# Patient Record
Sex: MALE | Race: WHITE | URBAN - METROPOLITAN AREA
[De-identification: names, ages, dates, MRNs, and addresses within clinical notes are randomized per-mention and may not be internally consistent; named-entity substitution may affect disease eponyms.]

---

## 2017-02-20 ENCOUNTER — APPOINTMENT (OUTPATIENT)
Dept: GENERAL RADIOLOGY | Facility: CLINIC | Age: 42
DRG: 390 | End: 2017-02-20
Attending: FAMILY MEDICINE
Payer: COMMERCIAL

## 2017-02-20 ENCOUNTER — HOSPITAL ENCOUNTER (INPATIENT)
Facility: CLINIC | Age: 42
LOS: 1 days | Discharge: HOME | DRG: 390 | End: 2017-02-21
Attending: FAMILY MEDICINE | Admitting: INTERNAL MEDICINE
Payer: COMMERCIAL

## 2017-02-20 ENCOUNTER — APPOINTMENT (OUTPATIENT)
Dept: CT IMAGING | Facility: CLINIC | Age: 42
DRG: 390 | End: 2017-02-20
Attending: FAMILY MEDICINE
Payer: COMMERCIAL

## 2017-02-20 DIAGNOSIS — K56.609 SMALL BOWEL OBSTRUCTION (H): ICD-10-CM

## 2017-02-20 PROBLEM — R03.0 ELEVATED BLOOD PRESSURE READING WITHOUT DIAGNOSIS OF HYPERTENSION: Status: ACTIVE | Noted: 2017-02-20

## 2017-02-20 PROBLEM — Z72.0 TOBACCO ABUSE: Status: ACTIVE | Noted: 2017-02-20

## 2017-02-20 LAB
ALBUMIN SERPL-MCNC: 4 G/DL (ref 3.4–5)
ALP SERPL-CCNC: 108 U/L (ref 40–150)
ALT SERPL W P-5'-P-CCNC: 38 U/L (ref 0–70)
ANION GAP SERPL CALCULATED.3IONS-SCNC: 10 MMOL/L (ref 3–14)
AST SERPL W P-5'-P-CCNC: 18 U/L (ref 0–45)
BASOPHILS # BLD AUTO: 0 10E9/L (ref 0–0.2)
BASOPHILS NFR BLD AUTO: 0.3 %
BILIRUB SERPL-MCNC: 0.4 MG/DL (ref 0.2–1.3)
BUN SERPL-MCNC: 12 MG/DL (ref 7–30)
CALCIUM SERPL-MCNC: 9.2 MG/DL (ref 8.5–10.1)
CHLORIDE SERPL-SCNC: 105 MMOL/L (ref 94–109)
CO2 SERPL-SCNC: 29 MMOL/L (ref 20–32)
CREAT SERPL-MCNC: 0.9 MG/DL (ref 0.66–1.25)
DIFFERENTIAL METHOD BLD: NORMAL
EOSINOPHIL # BLD AUTO: 0.1 10E9/L (ref 0–0.7)
EOSINOPHIL NFR BLD AUTO: 1 %
ERYTHROCYTE [DISTWIDTH] IN BLOOD BY AUTOMATED COUNT: 13.8 % (ref 10–15)
GFR SERPL CREATININE-BSD FRML MDRD: NORMAL ML/MIN/1.7M2
GLUCOSE SERPL-MCNC: 93 MG/DL (ref 70–99)
HCT VFR BLD AUTO: 46.3 % (ref 40–53)
HGB BLD-MCNC: 15.8 G/DL (ref 13.3–17.7)
IMM GRANULOCYTES # BLD: 0 10E9/L (ref 0–0.4)
IMM GRANULOCYTES NFR BLD: 0.2 %
LIPASE SERPL-CCNC: 101 U/L (ref 73–393)
LYMPHOCYTES # BLD AUTO: 1.3 10E9/L (ref 0.8–5.3)
LYMPHOCYTES NFR BLD AUTO: 22.4 %
MCH RBC QN AUTO: 28.2 PG (ref 26.5–33)
MCHC RBC AUTO-ENTMCNC: 34.1 G/DL (ref 31.5–36.5)
MCV RBC AUTO: 83 FL (ref 78–100)
MONOCYTES # BLD AUTO: 0.9 10E9/L (ref 0–1.3)
MONOCYTES NFR BLD AUTO: 14.7 %
NEUTROPHILS # BLD AUTO: 3.6 10E9/L (ref 1.6–8.3)
NEUTROPHILS NFR BLD AUTO: 61.4 %
PLATELET # BLD AUTO: 236 10E9/L (ref 150–450)
POTASSIUM SERPL-SCNC: 4.1 MMOL/L (ref 3.4–5.3)
PROT SERPL-MCNC: 8.2 G/DL (ref 6.8–8.8)
RBC # BLD AUTO: 5.6 10E12/L (ref 4.4–5.9)
SODIUM SERPL-SCNC: 144 MMOL/L (ref 133–144)
WBC # BLD AUTO: 5.9 10E9/L (ref 4–11)

## 2017-02-20 PROCEDURE — 96374 THER/PROPH/DIAG INJ IV PUSH: CPT

## 2017-02-20 PROCEDURE — 25000125 ZZHC RX 250: Performed by: RADIOLOGY

## 2017-02-20 PROCEDURE — 25000128 H RX IP 250 OP 636: Performed by: FAMILY MEDICINE

## 2017-02-20 PROCEDURE — 83690 ASSAY OF LIPASE: CPT | Performed by: FAMILY MEDICINE

## 2017-02-20 PROCEDURE — 96376 TX/PRO/DX INJ SAME DRUG ADON: CPT

## 2017-02-20 PROCEDURE — 99285 EMERGENCY DEPT VISIT HI MDM: CPT | Performed by: FAMILY MEDICINE

## 2017-02-20 PROCEDURE — 99222 1ST HOSP IP/OBS MODERATE 55: CPT | Mod: AI | Performed by: INTERNAL MEDICINE

## 2017-02-20 PROCEDURE — 85025 COMPLETE CBC W/AUTO DIFF WBC: CPT | Performed by: FAMILY MEDICINE

## 2017-02-20 PROCEDURE — 99207 ZZC MOONLIGHTING INDICATOR: CPT | Performed by: INTERNAL MEDICINE

## 2017-02-20 PROCEDURE — 25000128 H RX IP 250 OP 636: Performed by: INTERNAL MEDICINE

## 2017-02-20 PROCEDURE — 74177 CT ABD & PELVIS W/CONTRAST: CPT

## 2017-02-20 PROCEDURE — 96361 HYDRATE IV INFUSION ADD-ON: CPT

## 2017-02-20 PROCEDURE — 25500064 ZZH RX 255 OP 636: Performed by: RADIOLOGY

## 2017-02-20 PROCEDURE — 12000000 ZZH R&B MED SURG/OB

## 2017-02-20 PROCEDURE — 74020 XR ABDOMEN 2 VW: CPT | Mod: TC

## 2017-02-20 PROCEDURE — 96375 TX/PRO/DX INJ NEW DRUG ADDON: CPT

## 2017-02-20 PROCEDURE — 80053 COMPREHEN METABOLIC PANEL: CPT | Performed by: FAMILY MEDICINE

## 2017-02-20 PROCEDURE — 99285 EMERGENCY DEPT VISIT HI MDM: CPT | Mod: 25

## 2017-02-20 RX ORDER — ONDANSETRON 2 MG/ML
8 INJECTION INTRAMUSCULAR; INTRAVENOUS ONCE
Status: COMPLETED | OUTPATIENT
Start: 2017-02-20 | End: 2017-02-20

## 2017-02-20 RX ORDER — ONDANSETRON 2 MG/ML
4 INJECTION INTRAMUSCULAR; INTRAVENOUS EVERY 6 HOURS PRN
Status: DISCONTINUED | OUTPATIENT
Start: 2017-02-20 | End: 2017-02-22 | Stop reason: HOSPADM

## 2017-02-20 RX ORDER — SODIUM CHLORIDE 9 MG/ML
INJECTION, SOLUTION INTRAVENOUS CONTINUOUS
Status: DISCONTINUED | OUTPATIENT
Start: 2017-02-20 | End: 2017-02-21

## 2017-02-20 RX ORDER — SODIUM CHLORIDE 9 MG/ML
1000 INJECTION, SOLUTION INTRAVENOUS CONTINUOUS
Status: DISCONTINUED | OUTPATIENT
Start: 2017-02-20 | End: 2017-02-20

## 2017-02-20 RX ORDER — NALOXONE HYDROCHLORIDE 0.4 MG/ML
.1-.4 INJECTION, SOLUTION INTRAMUSCULAR; INTRAVENOUS; SUBCUTANEOUS
Status: DISCONTINUED | OUTPATIENT
Start: 2017-02-20 | End: 2017-02-22 | Stop reason: HOSPADM

## 2017-02-20 RX ORDER — ONDANSETRON 4 MG/1
4 TABLET, ORALLY DISINTEGRATING ORAL EVERY 6 HOURS PRN
Status: DISCONTINUED | OUTPATIENT
Start: 2017-02-20 | End: 2017-02-22 | Stop reason: HOSPADM

## 2017-02-20 RX ORDER — HYDROMORPHONE HYDROCHLORIDE 1 MG/ML
0.5 INJECTION, SOLUTION INTRAMUSCULAR; INTRAVENOUS; SUBCUTANEOUS
Status: DISCONTINUED | OUTPATIENT
Start: 2017-02-20 | End: 2017-02-20

## 2017-02-20 RX ORDER — IOPAMIDOL 755 MG/ML
500 INJECTION, SOLUTION INTRAVASCULAR ONCE
Status: COMPLETED | OUTPATIENT
Start: 2017-02-20 | End: 2017-02-20

## 2017-02-20 RX ORDER — HYDROMORPHONE HYDROCHLORIDE 1 MG/ML
.3-.5 INJECTION, SOLUTION INTRAMUSCULAR; INTRAVENOUS; SUBCUTANEOUS
Status: DISCONTINUED | OUTPATIENT
Start: 2017-02-20 | End: 2017-02-20

## 2017-02-20 RX ORDER — LIDOCAINE 40 MG/G
CREAM TOPICAL
Status: DISCONTINUED | OUTPATIENT
Start: 2017-02-20 | End: 2017-02-22 | Stop reason: HOSPADM

## 2017-02-20 RX ORDER — PROCHLORPERAZINE 25 MG
25 SUPPOSITORY, RECTAL RECTAL EVERY 12 HOURS PRN
Status: DISCONTINUED | OUTPATIENT
Start: 2017-02-20 | End: 2017-02-22 | Stop reason: HOSPADM

## 2017-02-20 RX ORDER — KETOROLAC TROMETHAMINE 30 MG/ML
30 INJECTION, SOLUTION INTRAMUSCULAR; INTRAVENOUS ONCE
Status: COMPLETED | OUTPATIENT
Start: 2017-02-20 | End: 2017-02-20

## 2017-02-20 RX ORDER — HYDROMORPHONE HYDROCHLORIDE 1 MG/ML
.3-.5 INJECTION, SOLUTION INTRAMUSCULAR; INTRAVENOUS; SUBCUTANEOUS EVERY 4 HOURS PRN
Status: DISCONTINUED | OUTPATIENT
Start: 2017-02-20 | End: 2017-02-22 | Stop reason: HOSPADM

## 2017-02-20 RX ORDER — PROCHLORPERAZINE MALEATE 5 MG
5-10 TABLET ORAL EVERY 6 HOURS PRN
Status: DISCONTINUED | OUTPATIENT
Start: 2017-02-20 | End: 2017-02-22 | Stop reason: HOSPADM

## 2017-02-20 RX ADMIN — KETOROLAC TROMETHAMINE 30 MG: 30 INJECTION, SOLUTION INTRAMUSCULAR at 09:33

## 2017-02-20 RX ADMIN — SODIUM CHLORIDE 1000 ML: 9 INJECTION, SOLUTION INTRAVENOUS at 09:20

## 2017-02-20 RX ADMIN — HYDROMORPHONE HYDROCHLORIDE 0.5 MG: 10 INJECTION, SOLUTION INTRAMUSCULAR; INTRAVENOUS; SUBCUTANEOUS at 23:23

## 2017-02-20 RX ADMIN — HYDROMORPHONE HYDROCHLORIDE 0.5 MG: 10 INJECTION, SOLUTION INTRAMUSCULAR; INTRAVENOUS; SUBCUTANEOUS at 18:41

## 2017-02-20 RX ADMIN — IOPAMIDOL 100 ML: 755 INJECTION, SOLUTION INTRAVENOUS at 11:57

## 2017-02-20 RX ADMIN — SODIUM CHLORIDE 60 ML: 9 INJECTION, SOLUTION INTRAVENOUS at 11:57

## 2017-02-20 RX ADMIN — ONDANSETRON HYDROCHLORIDE 8 MG: 2 SOLUTION INTRAMUSCULAR; INTRAVENOUS at 10:35

## 2017-02-20 RX ADMIN — HYDROMORPHONE HYDROCHLORIDE 0.5 MG: 1 INJECTION, SOLUTION INTRAMUSCULAR; INTRAVENOUS; SUBCUTANEOUS at 10:21

## 2017-02-20 RX ADMIN — SODIUM CHLORIDE 1000 ML: 9 INJECTION, SOLUTION INTRAVENOUS at 10:21

## 2017-02-20 RX ADMIN — HYDROMORPHONE HYDROCHLORIDE 0.5 MG: 10 INJECTION, SOLUTION INTRAMUSCULAR; INTRAVENOUS; SUBCUTANEOUS at 14:45

## 2017-02-20 RX ADMIN — SODIUM CHLORIDE: 9 INJECTION, SOLUTION INTRAVENOUS at 18:36

## 2017-02-20 RX ADMIN — HYDROMORPHONE HYDROCHLORIDE 0.5 MG: 1 INJECTION, SOLUTION INTRAMUSCULAR; INTRAVENOUS; SUBCUTANEOUS at 12:07

## 2017-02-20 ASSESSMENT — ACTIVITIES OF DAILY LIVING (ADL)
RETIRED_COMMUNICATION: 0-->UNDERSTANDS/COMMUNICATES WITHOUT DIFFICULTY
RETIRED_EATING: 0-->INDEPENDENT
FALL_HISTORY_WITHIN_LAST_SIX_MONTHS: NO
TOILETING: 0-->INDEPENDENT
AMBULATION: 0-->INDEPENDENT
BATHING: 0-->INDEPENDENT
SWALLOWING: 0-->SWALLOWS FOODS/LIQUIDS WITHOUT DIFFICULTY
COGNITION: 0 - NO COGNITION ISSUES REPORTED
DRESS: 0-->INDEPENDENT
TRANSFERRING: 0-->INDEPENDENT

## 2017-02-20 NOTE — H&P
[facility]    History and Physical  Hospitalist       Date of Admission:  2/20/2017  Date of Service (when I saw the patient): 02/20/17    Assessment & Plan       Active Problems:    Small intestine obstruction (H)    Assessment: etiology is unclear.  His CT is showing a SBO but he had a normal BM this morning and is passing some gas.  He is distended with upper abdominal pain, high pitched BS and vomiting this morning.  He has a previous SBO that resolved spontaneously.  No previous history of surgery.     Plan: admit to inpatient, NPO, IV fluids, antiemetics prn, narcotic pain meds prn, NG to LIS, surgery consult if not improving over the next 2 days      Tobacco abuse    Assessment: chronic    Plan: declines nicotine patch      Elevated blood pressure reading without diagnosis of hypertension    Assessment: no known history of HTN.  The elevated pressure could be from pain    Plan: follow    DVT Prophylaxis: Pneumatic Compression Devices  Code Status: Full Code    Disposition: Expected discharge in 3-4 days once SBO is resolved.    Tristin Galvan MD    Primary Care Physician   Fvl None    Chief Complaint   Abdominal pain    History is obtained from the patient    History of Present Illness   Teo Coon is a 41 year old male who presents with abdominal pain.  Four days he he had fever, chills and body aches that lasted until yesterday.  He had no URI symptoms nor did he have any GI symptoms with that illness.  He was feeling better and this morning woke up with epigastric pain. He says it felt similar to an episode he had 3 years ago of an SBO.  He had a normal bowel movement but then started vomiting.  The pain is sharp and seems to radiate into both the left and right upper quadrants.  The pain is not changed with activity or position.  The pain was severe so he presented to the ED, found to have an SBO and then admitted.    Past Medical History    I have reviewed this  patient's medical history and updated it with pertinent information if needed.   Past Medical History   Diagnosis Date     NO ACTIVE PROBLEMS        Past Surgical History   I have reviewed this patient's surgical history and updated it with pertinent information if needed.  Past Surgical History   Procedure Laterality Date     Xr shoulder surgery parveen right         Prior to Admission Medications   Prior to Admission Medications   Prescriptions Last Dose Informant Patient Reported? Taking?   IBUPROFEN PO 2/19/2017 at Unknown time  Yes Yes   Sig: Take 800 mg by mouth every 8 hours as needed for moderate pain      Facility-Administered Medications: None     Allergies   No Known Allergies    Social History   I have reviewed this patient's social history and updated it with pertinent information if needed. Teo Coon  reports that he has been smoking.  He has been smoking about 1.00 pack per day. He does not have any smokeless tobacco history on file. He reports that he does not drink alcohol or use illicit drugs.    Family History   I have reviewed this patient's family history and updated it with pertinent information if needed.   History reviewed. No pertinent family history.    Review of Systems   The 10 point Review of Systems is negative other than noted in the HPI or here.     Physical Exam   Temp: 99.1  F (37.3  C) Temp src: Oral BP: (!) 145/95 Pulse: 78 Heart Rate: 81 Resp: 18 SpO2: 96 % O2 Device: None (Room air)    Vital Signs with Ranges  Temp:  [97  F (36.1  C)-99.1  F (37.3  C)] 99.1  F (37.3  C)  Pulse:  [78-83] 78  Heart Rate:  [81] 81  Resp:  [12-18] 18  BP: (132-156)/() 145/95  SpO2:  [96 %-99 %] 96 %  210 lbs 0 oz    Constitutional:   awake, alert, cooperative, no apparent distress, and appears stated age     Eyes:   Lids and lashes normal, pupils equal, round and reactive to light, extra ocular muscles intact, sclera clear, conjunctiva normal     ENT:   Normocephalic, without obvious  abnormality, atraumatic, sinuses nontender on palpation, external ears without lesions, oral pharynx with moist mucous membranes, tonsils without erythema or exudates, gums normal and good dentition.     Neck:   Supple, symmetrical, trachea midline, no adenopathy, thyroid symmetric, not enlarged and no tenderness, skin normal     Hematologic / Lymphatic:   no cervical lymphadenopathy and no supraclavicular lymphadenopathy     Back:   Symmetric, no curvature, spinous processes are non-tender on palpation, paraspinous muscles are non-tender on palpation, no costal vertebral tenderness     Lungs:   No increased work of breathing, good air exchange, clear to auscultation bilaterally, no crackles or wheezing     Cardiovascular:   Normal apical impulse, regular rate and rhythm, normal S1 and S2, no S3 or S4, and no murmur noted     Abdomen:   Rare high pitched BS.  Distended but without focal tenderness.  No rebound or guarding.      Neurologic:   Awake, alert, oriented to name, place and time.  Cranial nerves II-XII are grossly intact.  Motor is 5 out of 5 bilaterally.    Sensory is intact.        Data   Data reviewed today:  I personally reviewed no images or EKG's today.    Recent Labs  Lab 02/20/17  0852   WBC 5.9   HGB 15.8   MCV 83         POTASSIUM 4.1   CHLORIDE 105   CO2 29   BUN 12   CR 0.90   ANIONGAP 10   XAVIER 9.2   GLC 93   ALBUMIN 4.0   PROTTOTAL 8.2   BILITOTAL 0.4   ALKPHOS 108   ALT 38   AST 18   LIPASE 101       Recent Results (from the past 24 hour(s))   XR Abdomen 2 Views    Narrative    XR ABDOMEN 2 VW 2/20/2017 9:21 AM     HISTORY: Abdominal pain    COMPARISON: None      Impression    IMPRESSION: There are multiple gas distended loops of small bowel  concerning for small bowel obstruction. No evidence of free  intraperitoneal air. No abnormal calcifications are seen. There is  atelectasis or infiltrate in the left lung base.    TONY STOVALL MD   CT Abdomen Pelvis w Contrast    Narrative     CT ABDOMEN/PELVIS WITH CONTRAST February 20, 2017 12:06 PM     HISTORY: Small bowel obstruction.    TECHNIQUE: CT abdomen and pelvis with 100mL, Isovue 370 IV. Radiation  dose for this scan was reduced using automated exposure control,  adjustment of the mA and/or kV according to patient size, or iterative  reconstruction technique.    COMPARISON: [facility] 2/20/2017.    FINDINGS: Multiple dilated small bowel loops identified. Gradual  transition to decompressed small bowel at the right abdomen. The most  distal small bowel loops are decompressed. No evidence for colonic  dilatation. Normal appendix. There is no abscess or free air. Moderate  distention of the stomach. Tiny hypodensity within the right liver is  technically nonspecific measuring 0.3 cm series 2 image 25. This is  size limited in assessment. Otherwise liver, gallbladder, spleen,  adrenals, pancreas, and kidneys show no acute abnormalities. Tiny  hypodensity at the medial lower left kidney may be a very small cyst  and is better demonstrated on coronal images. Nasogastric tube is  identified, its tip ending at the most proximal stomach just past the  GE junction.      Impression    IMPRESSION:  1. Multiple dilated small bowel loops with smooth decompression at the  distal small bowel on the right abdomen. This is consistent with a  small bowel obstruction.  2. No free fluid or free air.  3. Tiny hypodensity within the liver is technically nonspecific.  4. Nasogastric tube tip ends just within the most proximal stomach.  Please consider advancing this tube more distally.    ROLANDO FERNANDEZ MD

## 2017-02-20 NOTE — IP AVS SNAPSHOT
[address] [facility]    [facility]     [place] [address]    Phone:  [phone]                                       After Visit Summary   2/20/2017    Teo Coon    MRN: [id number]           After Visit Summary Signature Page     I have received my discharge instructions, and my questions have been answered. I have discussed any challenges I see with this plan with the nurse or doctor.    ..........................................................................................................................................  Patient/Patient Representative Signature      ..........................................................................................................................................  Patient Representative Print Name and Relationship to Patient    ..................................................               ................................................  Date                                            Time    ..........................................................................................................................................  Reviewed by Signature/Title    ...................................................              ..............................................  Date                                                            Time

## 2017-02-20 NOTE — IP AVS SNAPSHOT
MRN:[id number]                      After Visit Summary   2/20/2017    Teo Coon    MRN: [id number]           Thank you!     Thank you for choosing [facility] for your care. Our goal is always to provide you with excellent care. Hearing back from our patients is one way we can continue to improve our services. Please take a few minutes to complete the written survey that you may receive in the mail after you visit with us. Thank you!        Patient Information     Date Of Birth          1975        About your hospital stay     You were admitted on:  February 20, 2017 You last received care in the:  [address] [facility]    You were discharged on:  February 21, 2017       Who to Call     For medical emergencies, please call 911.  For non-urgent questions about your medical care, please call your primary care provider or clinic, None          Attending Provider     Provider Specialty    Antoine Rich MD Emergency Medicine    Louis Benavides MD Internal Medicine       Primary Care Provider    Zackery None       No address on file        After Care Instructions     Activity       Your activity upon discharge: activity as tolerated            Diet       Follow this diet upon discharge: Regular                  Follow-up Appointments     Follow-up and recommended labs and tests        Follow up with your primary provider within the next couple of weeks to recheck your stomach and your blood pressure                  Pending Results     No orders found for last 3 day(s).            Statement of Approval     Ordered          02/21/17 2118  I have reviewed and agree with all the recommendations and orders detailed in this document.  EFFECTIVE NOW     Approved and electronically signed by:  Tristin Galvan MD             Admission Information     Date & Time Provider Department Dept. Phone    2/20/2017 Louis Benavides MD [address] [facility] [phone]       Your Vitals Were     Blood Pressure Pulse Temperature Respirations Height Weight    157/97 83 100.5  F (38.1  C) (Oral) 20 1.829 m (6') 95.3 kg (210 lb)    Pulse Oximetry BMI (Body Mass Index)                98% 28.48 kg/m2          Care EveryWhere ID     This is your Care EveryWhere ID. This could be used by other organizations to access your [facility] medical records  [id number]           Review of your medicines      CONTINUE these medicines which have NOT CHANGED        Dose / Directions    IBUPROFEN PO        Dose:  800 mg   Take 800 mg by mouth every 8 hours as needed for moderate pain   Refills:  0                Protect others around you: Learn how to safely use, store and throw away your medicines at www.disposemymeds.org.             Medication List: This is a list of all your medications and when to take them. Check marks below indicate your daily home schedule. Keep this list as a reference.      Medications           Morning Afternoon Evening Bedtime As Needed    IBUPROFEN PO   Take 800 mg by mouth every 8 hours as needed for moderate pain

## 2017-02-20 NOTE — ED NOTES
NG placed back to low intermittent suctions, pain meds given and patient is going to rest.  Requesting ice chips will address with MD and primary nurse

## 2017-02-20 NOTE — ED PROVIDER NOTES
History     Chief Complaint   Patient presents with     Abdominal Pain     HPI  Teo Coon is a 41 year old male who presents with upper abdominal pain this been going on since early this morning.  Patient recently has been dealing with fever and body aches.  This resolved 2 days ago in the now the belly pain and vomiting and diarrhea started.  Patient has had 3 episodes of vomiting since this started.  He's had about 5 stools since then.  He denies any blood in the stools or melena.  Patient is not had any more fevers since 2 days ago.  Patient has had no surgeries on her belly.  Patient states that the pain seems to be a little bit better after vomiting.   patient states a similar episode happened about 3 years ago when he was diagnosed with a small bowel obstruction in [place].    I have reviewed the Medications, Allergies, Past Medical and Surgical History, and Social History in the Epic system.    Review of Systems   All other systems reviewed and are negative.      Physical Exam   BP: (!) 154/113  Pulse: 83  Temp: 97  F (36.1  C)  Resp: 14  Height: 182.9 cm (6')  Weight: 95.3 kg (210 lb)  SpO2: 99 %  Physical Exam   Constitutional: He is oriented to person, place, and time. He appears well-developed and well-nourished. No distress.   HENT:   Head: Normocephalic and atraumatic.   Nose: Nose normal.   Mouth/Throat: Oropharynx is clear and moist. No oropharyngeal exudate.   Eyes: Conjunctivae are normal. Pupils are equal, round, and reactive to light. No scleral icterus.   Neck: Normal range of motion.   Cardiovascular: Normal rate, regular rhythm, normal heart sounds and intact distal pulses.  Exam reveals no friction rub.    No murmur heard.  Pulmonary/Chest: Effort normal and breath sounds normal. No respiratory distress. He has no wheezes. He has no rales.   Abdominal: Soft. Bowel sounds are normal. He exhibits no distension and no mass. There is tenderness (ruq/luq). There is no rebound and no guarding.    Musculoskeletal: Normal range of motion. He exhibits no edema or tenderness.   Neurological: He is alert and oriented to person, place, and time.   Skin: Skin is warm. No rash noted. He is not diaphoretic.   Psychiatric: Judgment normal.   Nursing note and vitals reviewed.      ED Course     ED Course     Procedures         Results for orders placed or performed during the hospital encounter of 02/20/17   XR Abdomen 2 Views    Narrative    XR ABDOMEN 2 VW 2/20/2017 9:21 AM     HISTORY: Abdominal pain    COMPARISON: None      Impression    IMPRESSION: There are multiple gas distended loops of small bowel  concerning for small bowel obstruction. No evidence of free  intraperitoneal air. No abnormal calcifications are seen. There is  atelectasis or infiltrate in the left lung base.    TONY STOVALL MD   CBC with platelets differential   Result Value Ref Range    WBC 5.9 4.0 - 11.0 10e9/L    RBC Count 5.60 4.4 - 5.9 10e12/L    Hemoglobin 15.8 13.3 - 17.7 g/dL    Hematocrit 46.3 40.0 - 53.0 %    MCV 83 78 - 100 fl    MCH 28.2 26.5 - 33.0 pg    MCHC 34.1 31.5 - 36.5 g/dL    RDW 13.8 10.0 - 15.0 %    Platelet Count 236 150 - 450 10e9/L    Diff Method Automated Method     % Neutrophils 61.4 %    % Lymphocytes 22.4 %    % Monocytes 14.7 %    % Eosinophils 1.0 %    % Basophils 0.3 %    % Immature Granulocytes 0.2 %    Absolute Neutrophil 3.6 1.6 - 8.3 10e9/L    Absolute Lymphocytes 1.3 0.8 - 5.3 10e9/L    Absolute Monocytes 0.9 0.0 - 1.3 10e9/L    Absolute Eosinophils 0.1 0.0 - 0.7 10e9/L    Absolute Basophils 0.0 0.0 - 0.2 10e9/L    Abs Immature Granulocytes 0.0 0 - 0.4 10e9/L   Comprehensive metabolic panel   Result Value Ref Range    Sodium 144 133 - 144 mmol/L    Potassium 4.1 3.4 - 5.3 mmol/L    Chloride 105 94 - 109 mmol/L    Carbon Dioxide 29 20 - 32 mmol/L    Anion Gap 10 3 - 14 mmol/L    Glucose 93 70 - 99 mg/dL    Urea Nitrogen 12 7 - 30 mg/dL    Creatinine 0.90 0.66 - 1.25 mg/dL    GFR Estimate >90  Non African  American GFR Calc   >60 mL/min/1.7m2    GFR Estimate If Black >90   GFR Calc   >60 mL/min/1.7m2    Calcium 9.2 8.5 - 10.1 mg/dL    Bilirubin Total 0.4 0.2 - 1.3 mg/dL    Albumin 4.0 3.4 - 5.0 g/dL    Protein Total 8.2 6.8 - 8.8 g/dL    Alkaline Phosphatase 108 40 - 150 U/L    ALT 38 0 - 70 U/L    AST 18 0 - 45 U/L   Lipase   Result Value Ref Range    Lipase 101 73 - 393 U/L     Medications   lidocaine 1 % 1 mL (not administered)   lidocaine (LMX4) kit (not administered)   sodium chloride (PF) 0.9% PF flush 3 mL (not administered)   sodium chloride (PF) 0.9% PF flush 3 mL (not administered)   0.9% sodium chloride BOLUS (not administered)     Followed by   0.9% sodium chloride infusion (not administered)   HYDROmorphone (PF) (DILAUDID) injection 0.5 mg (not administered)   ketorolac (TORADOL) injection 30 mg (30 mg Intravenous Given 2/20/17 0933)     x-ray shows findings consistent with the small bowel obstruction.  Patient has a history of the exact same presentation 3 years ago.  At that time he did have a CT scan and then a follow-up colonoscopy all of which were normal and couldn't find the etiology of his bowel extraction.  Patient is having significant pain still despite the Toradol and Dilaudid.  Will plan on placing an NG tube in an admission for fluid hydration and NPO status.  Will consult surgery and suspect this should resolve with conservative care.  I discussed the case with the on-call surgeon and she recommended getting a CT scan.  I also consulted with radiology and they recommend to do contrast so we can really make sure there is no significant issue that is causing the bowel obstruction.  An NG tube was placed and then we did administer contrast via the NG tube.  CT shows findings still consistent with a small bowel obstruction.  We'll go ahead and admit the patient for continued treatment.    Assessments & Plan (with Medical Decision Making)  small bowel obstruction      I have  reviewed the nursing notes.    I have reviewed the findings, diagnosis, plan and need for follow up with the patient.        2/20/2017   [facility] EMERGENCY DEPARTMENT     Antoine Rich MD  02/20/17 0950       Antoine Rich MD  02/20/17 4815

## 2017-02-20 NOTE — ED NOTES
ED Nursing criteria listed below was addressed during verbal handoff:     Abnormal vitals: No  Abnormal results: Yes  Med Reconciliation completed: Yes  Meds given in ED: Yes  Any Overdue Meds: No  Core Measures: N/A  Isolation: N/A  Special needs: No  Skin assessment: Yes    Observation Patient  Education provided: N/A

## 2017-02-20 NOTE — CONSULTS
"SURGICAL CONSULTATION      REASON FOR CONSULTATION:  Mr. Teo Coon is a 41-year-old who was admitted today with complaints of acute onset of abdominal pain, nausea and vomiting.      HISTORY OF PRESENT ILLNESS:  The patient has since Thursday had chills and fevers that finally resolved over the weekend.  He was at work today, painting in a mold infested location when he developed severe nausea and vomiting, having had 3 episodes; he had 5 loose stools and because of the intensity of the pain rated 9/10, he came to the emergency room.  He complains of pain on his \"lower sternum\" that stands out and abdominal distention.  Prior to this, the patient stated that he was having no problems with eating and passing his stool and gas.  He has denied any sick contacts and denied having any contact with patients, specifically with gastroenteritis or any other GI or viral illnesses.  He states that about 3 years ago a similar event happened after he was exposed to birds.  He was cleaning out a  large bird cage and got violently ill with a similar response with nausea and vomiting and they could not find what was wrong.  He was hospitalized for 4 days with an NG tube and finally got better.  He denies having any prior history of surgeries as a child or an adult and denied having any groin hernias or umbilical hernias.  He noted that his cousin had similar problems on a \"daily basis\" and have visited doctors at [facility], but no one can find what is wrong with him.      PAST MEDICAL HISTORY:  Unremarkable.      PAST SURGICAL HISTORY:  Negative.  He specifically denies having had hernias and/or abdominal surgeries.      FAMILY HISTORY:  Significant for father that  young from colon cancer.  Mother who is healthy with no medical problems.       He is on no medications.  He does not have any allergies.       RECENT LABS AND IMAGING INFORMATION:   The results of your  lab tests were in acceptable range.  CTA/P reveal partial " small bowel obstruction with no masses     PHYSICAL EXAMINATION:   GENERAL:  The patient looks mildly ill, lying in bed supine with an NG tube, putting out light biliary material.    He has an obvious distended abdomen.   He has paint all over his hands and chest and abdomen.    SKIN:   Skin appears sallow and a bit yellow.     CHEST:  He is breathing without any complications.     ABDOMEN:  His abdomen is soft, but mild to moderately distended.  I do not feel any masses.  He does have an umbilical hernia with reducible omentum, small amount.  His groins have no evidence of inguinal hernia.  He does have mild epigastric tenderness, no Hess's sign on the right upper quadrant exam and no evidence of an enlarged gallbladder or tender gallbladder.      ASSESSMENT AND PLAN:  Partial small-bowel obstruction.  The patient is passing gas and had 5 bowel movements and his exam and his imaging was consistent with a distal small-bowel obstruction that is not complete.  I agree with the plan for NG tube decompression and am concerned that the patient has no prior history of surgery or hernias and has these obstructive episodes.  I am very suspicious of an underlying malignancy on him maybe a neuroendocrine tumor or small bowel lipoma, etc.  If he does not recover with conservative management, he may need laparoscopic exploration at minimal to try to identify a source.        He does have an umbilical hernia, but this does not appear to be contributing to his current symptoms as there is no small bowel in the hernia sac and it is only small amounts of omentum.  Tomorrow we should switch him over to D5 half with 20K to replace potassium he loses from gastric contents and I have asked him to ambulate and stay as active as possible.  No surgery recommended at that time.  We will follow with you.         KRYSTINA CARPENTER MD             D: 02/20/2017 14:59   T: 02/20/2017 15:37   MT: [id number]      Name:     LISANDRA MATHEWS   MRN:       [id number]        Account:       [id number]   :      1975           Consult Date:  2017      Document: [id number]

## 2017-02-21 VITALS
BODY MASS INDEX: 28.44 KG/M2 | RESPIRATION RATE: 20 BRPM | HEART RATE: 83 BPM | HEIGHT: 72 IN | OXYGEN SATURATION: 98 % | WEIGHT: 210 LBS | TEMPERATURE: 100.5 F | DIASTOLIC BLOOD PRESSURE: 97 MMHG | SYSTOLIC BLOOD PRESSURE: 157 MMHG

## 2017-02-21 PROCEDURE — 99238 HOSP IP/OBS DSCHRG MGMT 30/<: CPT | Performed by: INTERNAL MEDICINE

## 2017-02-21 PROCEDURE — 25000128 H RX IP 250 OP 636: Performed by: FAMILY MEDICINE

## 2017-02-21 RX ADMIN — SODIUM CHLORIDE: 9 INJECTION, SOLUTION INTRAVENOUS at 03:33

## 2017-02-21 NOTE — PLAN OF CARE
Problem: Goal Outcome Summary  Goal: Goal Outcome Summary  Outcome: Therapy, progress toward functional goals is gradual  VSS on RA.  Bp elevated- 158/107 down to 151/95.  Afebrile.  Bowel sounds normoactive, denies passing gas, Abd distended with tenderness.  Receiving IV dilaudid, stated would call when he needs more, Pt resting comfortably in room.  NG to LIS with small brown output.  Skin intact. Up to bathroom independently.  Refused SCDs, ambulating in room frequently.  Wife in room at bedside.

## 2017-02-21 NOTE — PLAN OF CARE
Problem: Goal Outcome Summary  Goal: Goal Outcome Summary  Outcome: Improving  S-(situation): shift note     B-(background): small bowel obstruction     A-(assessment): patient alert and oriented denied any complaints of nausea and stated that his abdominal pain was minimal and refused any pain medications at this time. He voiced that he had had some oral liquid overnight and NG was clamped. He tolerated clear liquids with out nausea or pain.       R-(recommendations): informed MD of clear liquid tolerance will notify MD of any further changes.

## 2017-02-21 NOTE — PLAN OF CARE
"Problem: Goal Outcome Summary  Goal: Goal Outcome Summary  Outcome: No Change  Dilaudid given once for abdominal pain.  BS faint, abdomen appears distended, NG to LIS with 950 cc light brown.  NPO pt states he did have a couple \"small sips\" of water.  Reminded pt to only use the pink sponge swabs to wet his mouth, no sips of water or ice chips.  Ambulated twice in the [place] this evening.  Max T.99.1      "

## 2017-02-22 NOTE — PLAN OF CARE
Problem: Bowel Obstruction (Adult)  Goal: Signs and Symptoms of Listed Potential Problems Will be Absent or Manageable (Bowel Obstruction)  Signs and symptoms of listed potential problems will be absent or manageable by discharge/transition of care (reference Bowel Obstruction (Adult) CPG).   Outcome: Therapy, progress toward functional goals as expected  Pt tolerated mashed potatoes for supper. No c/o's pain or nausea. Passing flatus.  Up in halls independently. Pt requesting to D/C tonight.

## 2017-02-22 NOTE — DISCHARGE SUMMARY
[facility]    Discharge Summary  Hospitalist    Date of Admission:  2/20/2017  Date of Discharge:  2/21/2017  Discharging Provider: Tristin Galvan MD  Date of Service (when I saw the patient): 02/21/17    Discharge Diagnoses   Active Problems:    Small intestine obstruction (H)    Tobacco abuse    Elevated blood pressure reading without diagnosis of hypertension       History of Present Illness   Copied from H&P:   Teo Coon is a 41 year old male who presents with abdominal pain. Four days he he had fever, chills and body aches that lasted until yesterday. He had no URI symptoms nor did he have any GI symptoms with that illness. He was feeling better and this morning woke up with epigastric pain. He says it felt similar to an episode he had 3 years ago of an SBO. He had a normal bowel movement but then started vomiting. The pain is sharp and seems to radiate into both the left and right upper quadrants. The pain is not changed with activity or position. The pain was severe so he presented to the ED, found to have an SBO and then admitted.       Hospital Course   Teo Coon was admitted on 2/20/2017.  The following problems were addressed during his hospitalization:    Active Problems:    Small intestine obstruction (H)    Assessment: Initially treated with bowel rest, IV fluids, IV pain medications, antiemetics and NG to LIS.  By the following morning he was feeling better.  He was having flatus and his pain was improving.  His NG was able to be removed and he tolerated an advancing diet without pain, nausea or vomiting.  At that time he was anxious for discharge and medically seems to have resolved his SBO so was felt safe for discharge.    Plan: discharge home.  F/U with PMD within two weeks      Tobacco abuse    Assessment: chronic    Plan: advised no smoking      Elevated blood pressure reading without diagnosis of hypertension    Assessment: noted.    Plan: advised to  recheck in clinic but did not initiate medications while here since it was unclear how much his pain was affecting his BP.      Tristin Galvan MD    Significant Results and Procedures   No procedures performed during this admission    Pending Results   These results will be followed up by   Unresulted Labs Ordered in the Past 30 Days of this Admission     No orders found for last 61 day(s).          Code Status   Full Code       Primary Care Physician   Fvl None    Physical Exam   Temp: 100.5  F (38.1  C) Temp src: Oral BP: (!) 150/93 Pulse: 86 Heart Rate: 86 Resp: 20 SpO2: 98 % O2 Device: None (Room air)    Vitals:    02/20/17 0812   Weight: 95.3 kg (210 lb)     Vital Signs with Ranges  Temp:  [95.9  F (35.5  C)-100.5  F (38.1  C)] 100.5  F (38.1  C)  Pulse:  [77-86] 86  Heart Rate:  [77-86] 86  Resp:  [18-20] 20  BP: (123-158)/() 150/93  SpO2:  [96 %-98 %] 98 %  I/O last 3 completed shifts:  In: 1275.83 [P.O.:480; I.V.:795.83]  Out: 1650 [Emesis/NG output:1650]    Lungs:  CTA throughout  CV:  RRR without murmur  Abdomen: +BS, Soft, NT        Discharge Disposition   Discharged to home  Condition at discharge: Stable    Consultations This Hospital Stay   SURGERY GENERAL IP CONSULT    Time Spent on this Encounter   I, Tristin Galvan MD, personally saw the patient today and spent less than or equal to 30 minutes discharging this patient.    Discharge Orders     Follow-up and recommended labs and tests    Follow up with your primary provider within the next couple of weeks to recheck your stomach and your blood pressure     Activity   Your activity upon discharge: activity as tolerated     Diet   Follow this diet upon discharge: Regular       Discharge Medications   Current Discharge Medication List      CONTINUE these medications which have NOT CHANGED    Details   IBUPROFEN PO Take 800 mg by mouth every 8 hours as needed for moderate pain           Allergies   No Known Allergies  Data   Most Recent  3 CBC's:  Recent Labs   Lab Test  02/20/17   0852   WBC  5.9   HGB  15.8   MCV  83   PLT  236      Most Recent 3 BMP's:  Recent Labs   Lab Test  02/20/17   0852   NA  144   POTASSIUM  4.1   CHLORIDE  105   CO2  29   BUN  12   CR  0.90   ANIONGAP  10   XAVIER  9.2   GLC  93     Most Recent 2 LFT's:  Recent Labs   Lab Test  02/20/17   0852   AST  18   ALT  38   ALKPHOS  108   BILITOTAL  0.4     Most Recent INR's and Anticoagulation Dosing History:  Anticoagulation Dose History     There is no flowsheet data to display.        Most Recent 3 Troponin's:No lab results found.  Most Recent Cholesterol Panel:No lab results found.  Most Recent 6 Bacteria Isolates From Any Culture (See EPIC Reports for Culture Details):No lab results found.  Most Recent TSH, T4 and A1c Labs:No lab results found.